# Patient Record
Sex: FEMALE | Race: WHITE | NOT HISPANIC OR LATINO | Employment: FULL TIME | URBAN - NONMETROPOLITAN AREA
[De-identification: names, ages, dates, MRNs, and addresses within clinical notes are randomized per-mention and may not be internally consistent; named-entity substitution may affect disease eponyms.]

---

## 2022-04-08 ENCOUNTER — TRANSCRIBE ORDERS (OUTPATIENT)
Dept: ADMINISTRATIVE | Facility: HOSPITAL | Age: 52
End: 2022-04-08

## 2022-04-08 DIAGNOSIS — C53.9 MALIGNANT NEOPLASM OF CERVIX, UNSPECIFIED SITE: Primary | ICD-10-CM

## 2022-04-15 ENCOUNTER — HOSPITAL ENCOUNTER (OUTPATIENT)
Dept: CT IMAGING | Facility: HOSPITAL | Age: 52
Discharge: HOME | End: 2022-04-15

## 2022-04-15 PROCEDURE — A9552 F18 FDG: HCPCS | Performed by: OBSTETRICS & GYNECOLOGY

## 2022-04-15 PROCEDURE — 0 FLUDEOXYGLUCOSE F18 SOLUTION: Performed by: OBSTETRICS & GYNECOLOGY

## 2022-04-15 PROCEDURE — 78815 PET IMAGE W/CT SKULL-THIGH: CPT

## 2022-04-15 RX ADMIN — FLUDEOXYGLUCOSE F18 1 DOSE: 300 INJECTION INTRAVENOUS at 11:07

## 2022-06-06 ENCOUNTER — TELEPHONE (OUTPATIENT)
Dept: RADIATION ONCOLOGY | Facility: HOSPITAL | Age: 52
End: 2022-06-06

## 2022-06-06 NOTE — TELEPHONE ENCOUNTER
Called to set up consult appt for pt, pt stated she is going to begin her external beam tx later this week at [facility]. She had called them regarding this appt and they stated it would be after her external txs were completed there. Scheduled pt for appt on July 6 @ 10 AM, pt to contact our office if it needs to be r/s.

## 2022-06-30 PROBLEM — C53.9 SQUAMOUS CELL CARCINOMA OF CERVIX: Status: ACTIVE | Noted: 2022-05-19

## 2022-07-04 NOTE — PROGRESS NOTES
[facility] MD      KERMIT Robles  ________________________________________  [facility]  Department of Radiation Oncology  [address] [place] [id number]  Office:  [phone]  Fax: [phone]    DATE:  07/06/2022  PATIENT:  Meghan Mary 1970                 MEDICAL RECORD #:  [id number]                                                       REASON FOR VISIT  Chief Complaint   Patient presents with   • Cervical Cancer     Discuss high dose rate brachytherapy     Meghan Mary is a very pleasant female that has been referred to our office for radiotherapy considerations. Reports appetite change, fatigue, dysuria, frequency with urination, and pelvic pain. Denies unexpected weight change, cough, SOB, nausea/vomiting, dizziness, extremity weakness, and headaches. She follows  and  in [place], [place].    History of Present Illness:  03/08/2022 - Transvaginal Ultrasound due to pelvic/perineal pain:  • Suboptimal visualization of the uterus with a thickened endometrium.  • Nonvisualization of the ovaries.    03/25/2022 - Diagnostic hysteroscopy with dilatation and curettage/biopsy:  • Endometrium (curettage):  o Fragments of invasive squamous cell carcinoma, moderately differentiated  • Cervix:  o Fragments of invasive squamous cell carcinoma, moderately differentiated  Note: The morphologic findings in part A and B are similar and show an invasive moderately differentiatesquamous cell carcinoma.  Depth of invasion cannot be assessed due to the fragmented and tangential nature of the biopsies.  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  A p16 stain has been ordered and will be reported in an addendum to follow.    04/15/2022 - PET Scan:  • Thickening and marked hypermetabolic activity in the cervix which extends into the lower uterus. Maximum SUV is 21.  • Hypermetabolic  soft tissue extends posteriorly from the cervix towards the rectosigmoid colon, and soft tissue hypermetabolic activity appears to directly involve the sigmoid colon. Maximum SUV is 18.39.   • Enlarged hypermetabolic LEFT pelvic sidewall lymph node measuring 1.9 x 1.8 cm with a maximum SUV of 11.69.  Impression:  • Hypermetabolic cervical wall thickening corresponding to biopsy-proven neoplasm. This process extends into the lower uterine body. Additionally, there is hypermetabolic soft tissue extending posteriorly towards the sigmoid colon and there appears to be direct invasion/involvement of the sigmoid colon.  • Enlarged hypermetabolic LEFT pelvic sidewall lymph node, most compatible with renan metastasis.  • No evidence of metastatic disease in the neck, chest, or abdomen.    04/29/2022 - Cystoscopy, proctoscopy, and cervical biopsies per  at the [facility]:  • Cervix biopsy:  o Squamous cell carcinoma, moderately differentiated  o Lymphovascular invasion can not be excluded.    05/04/2022 - Appointment with Demetria at the [facility]:  • Recommended treatment with primary whole pelvis external beam radiotherapy with concomitant cisplatin base chemotherapy to be followed by brachytherapy    05/19/2022 - Appointment with :  • I reviewed the patient's scans and discussed the case personally with our radiation oncologist. At this point in time we will proceed with concurrent chemoradiation therapy.   • She will need to be on weekly cisplatin. I thoroughly discussed with her the pros and cons risks and benefits of chemotherapy in details and she understood and agreed.   • Given the fact that she will be on cisplatin she will need to have port placement so we will send her for port consideration.   • When she have the port we can proceed with the treatment so we will see her back in 2 weeks to start her on the treatment.     She is currently undergoing radiation  therapy to the pelvis per  in [facility].    History obtained from  PATIENT and CHART    PAST MEDICAL HISTORY  Past Medical History:   Diagnosis Date   • Cervical cancer (HCC)    • Hypertension       PAST SURGICAL HISTORY  Past Surgical History:   Procedure Laterality Date   • BREAST LUMPECTOMY Right 2004    not malignant   • CERVICAL BIOPSY     • COLONOSCOPY        FAMILY HISTORY  family history includes Cancer in her mother.     SOCIAL HISTORY  Social History     Tobacco Use   • Smoking status: Former Smoker     Packs/day: 0.25     Years: 2.00     Pack years: 0.50   Substance Use Topics   • Alcohol use: Not Currently   • Drug use: Never        ALLERGIES  Patient has no known allergies.     MEDICATIONS    Current Outpatient Medications:   •  amLODIPine (NORVASC) 5 MG tablet, Take 5 mg by mouth Daily., Disp: , Rfl:   •  HYDROcodone-acetaminophen (NORCO) 7.5-325 MG per tablet, Take 1 tablet by mouth Every 6 (Six) Hours As Needed. Not taking, Disp: , Rfl:   •  lidocaine-prilocaine (EMLA) 2.5-2.5 % cream, For port, Disp: , Rfl:   •  ondansetron (ZOFRAN) 8 MG tablet, Take 1 tablet by mouth Every 8 (Eight) Hours As Needed., Disp: , Rfl:      Current outpatient and discharge medications have been reconciled for the patient.  Reviewed by: Jw Muñiz III, MD    The following portions of the patient's history were reviewed and updated as appropriate: allergies, current medications, past family history, past medical history, past social history, past surgical history and problem list.    REVIEW OF SYSTEMS  Review of Systems   Constitutional: Positive for appetite change and fatigue. Negative for unexpected weight change.   HENT:  Negative.    Eyes: Negative.    Respiratory: Negative for cough and shortness of breath.    Cardiovascular: Negative.    Gastrointestinal: Negative for nausea, rectal pain and vomiting.   Genitourinary: Positive for dysuria, frequency and pelvic pain.    Musculoskeletal: Negative.   "  Neurological: Negative for dizziness, extremity weakness and headaches.   Hematological: Negative.    Psychiatric/Behavioral: Negative.      I have reviewed and confirmed the accuracy of the ROS as documented by the MA/LPN/RN Jw Muñiz III, MD     PHYSICAL EXAM  Vital Signs:   Vitals:    07/06/22 1024   BP: 141/84   Pulse: 65   Resp: 18   SpO2: 99%   Weight: 88.9 kg (196 lb)   Height: 157.5 cm (62\")   PainSc: 0-No pain      Physical Exam  Vitals reviewed.   Constitutional:       Appearance: Normal appearance.   HENT:      Head: Normocephalic.   Cardiovascular:      Rate and Rhythm: Normal rate and regular rhythm.      Pulses: Normal pulses.      Heart sounds: Normal heart sounds. No murmur heard.    No friction rub. No gallop.   Pulmonary:      Effort: Pulmonary effort is normal. No respiratory distress.      Breath sounds: Normal breath sounds. No wheezing.   Chest:      Chest wall: No tenderness.   Abdominal:      General: Bowel sounds are normal.      Palpations: Abdomen is soft.   Genitourinary:     Comments: Deferred per patient, reports pain with pelvic exams.  Musculoskeletal:         General: Normal range of motion.      Cervical back: Normal range of motion and neck supple.   Skin:     General: Skin is warm and dry.      Capillary Refill: Capillary refill takes less than 2 seconds.   Neurological:      General: No focal deficit present.      Mental Status: She is alert.   Psychiatric:         Mood and Affect: Mood normal.         Behavior: Behavior normal.         Performance Status: ECOG (0) Fully active, able to carry on all predisease performance without restriction    Clinical Quality Measures  -Pain Documented by Standardized Tool, FPS  Meghan Mary reports a pain score of 0. Given her pain assessment as noted, treatment options were discussed and the following options were decided upon as a follow-up plan to address the patient's pain: No pain, no plan given.   Pain Medications             " HYDROcodone-acetaminophen (NORCO) 7.5-325 MG per tablet Take 1 tablet by mouth Every 6 (Six) Hours As Needed. Not taking        -Advanced Care Planning Advance Care Planning   ACP discussion was held with the patient during this visit. Patient has an advance directive in EMR which is still valid.      -Body Mass Index Screening and Follow-Up Plan Class 2 Severe Obesity (BMI >=35 and <=39.9). Obesity-related health conditions include the following: none.     -Tobacco Use: Screening and Cessation Intervention Social History    Tobacco Use      Smoking status: Former Smoker        Packs/day: 0.25        Years: 2.00        Pack years: .5      Smokeless tobacco: Not on file    ASSESSMENT AND PLAN  1. Squamous cell carcinoma of cervix (HCC)      Orders Placed This Encounter   Procedures   • Ambulatory Referral to ONC Social Work   • Ambulatory Referral to Radiation Oncology-External      RECOMMENDATIONS: Meghan Mary has been referred to our office today to discuss radiotherapy recommendations for vaginal brachytherapy.      We have discussed the indications and rationale of radiation therapy according to the NCCN Guidelines. I have extensively reviewed the risks, benefits and alternatives of therapy and progression of disease in spite of therapy with either local or systemic failure. The option of definitive surgery has also been reviewed as well as surveillance.  I have seen, examined and reviewed her medication list, appropriate labs and imaging studies as well as other physician notes. We discussed the goals/plans of care and answered all questions.     After consideration of the diagnostic data and evaluation of the patient, I have recommended to treat with brachytherapy. She does report significant pain with traditional pelvic examinations. Given the nature of these brachytherapy treatments, I will refer her to radiation oncology in [place] for treatment under anesthesia.     The patient and her family  verbalize understanding of this discussion, voice no further questions and wish to proceed with recommendations.  Continue ongoing management per primary care physician and other specialists. Thank you for allowing me to assist in this patients care.    Patient Instructions   1) Will refer you to radiation oncology in [place] for brachytherapy with anesthesia.     Time Spent: I spent 45 minutes caring for Meghan on this date of service. This time includes time spent by me in the following activities: preparing for the visit, reviewing tests, obtaining and/or reviewing a separately obtained history, performing a medically appropriate examination and/or evaluation, counseling and educating the patient/family/caregiver, ordering medications, tests, or procedures, referring and communicating with other health care professionals, documenting information in the medical record and independently interpreting results and communicating that information with the patient/family/caregiver.   Jw Muñiz III, MD   07/06/2022

## 2022-07-05 ENCOUNTER — HOSPITAL ENCOUNTER (OUTPATIENT)
Dept: RADIATION ONCOLOGY | Facility: HOSPITAL | Age: 52
Setting detail: RADIATION/ONCOLOGY SERIES
End: 2022-07-05

## 2022-07-06 ENCOUNTER — DOCUMENTATION (OUTPATIENT)
Dept: RADIATION ONCOLOGY | Facility: HOSPITAL | Age: 52
End: 2022-07-06

## 2022-07-06 ENCOUNTER — CONSULT (OUTPATIENT)
Dept: RADIATION ONCOLOGY | Facility: HOSPITAL | Age: 52
End: 2022-07-06

## 2022-07-06 VITALS
HEART RATE: 65 BPM | HEIGHT: 62 IN | BODY MASS INDEX: 36.07 KG/M2 | WEIGHT: 196 LBS | RESPIRATION RATE: 18 BRPM | OXYGEN SATURATION: 99 % | DIASTOLIC BLOOD PRESSURE: 84 MMHG | SYSTOLIC BLOOD PRESSURE: 141 MMHG

## 2022-07-06 DIAGNOSIS — C53.9 SQUAMOUS CELL CARCINOMA OF CERVIX: Primary | ICD-10-CM

## 2022-07-06 PROCEDURE — G0463 HOSPITAL OUTPT CLINIC VISIT: HCPCS | Performed by: RADIOLOGY

## 2022-07-06 RX ORDER — HYDROCODONE BITARTRATE AND ACETAMINOPHEN 7.5; 325 MG/1; MG/1
1 TABLET ORAL EVERY 6 HOURS PRN
COMMUNITY
Start: 2022-06-03

## 2022-07-06 RX ORDER — LIDOCAINE AND PRILOCAINE 25; 25 MG/G; MG/G
CREAM TOPICAL
COMMUNITY
Start: 2022-06-15

## 2022-07-06 RX ORDER — ONDANSETRON HYDROCHLORIDE 8 MG/1
1 TABLET, FILM COATED ORAL EVERY 8 HOURS PRN
COMMUNITY
Start: 2022-06-15

## 2022-07-06 RX ORDER — AMLODIPINE BESYLATE 5 MG/1
5 TABLET ORAL DAILY
COMMUNITY
Start: 2022-05-19

## 2022-07-06 NOTE — PROGRESS NOTES
NAYAN met with Ms. Mary due to her distress score. She is here for a radiation consultation for squamous cell carcinomas of cervix. NAYAN introduced self and explained role and source of support. She is 52 years old and lives with her children ages are 14, 16, and 17. Her support system does include her patents, children, and sister. Ms. Mary states she does not like to express her feelings. She currently works at [employer] and has been working throughout treatment. She is feeling very nervous about the thought of internal radiation. NAYAN provided emotional support through utilizing empathy and validating and normalizing identified emotions. She does not take any medication for anxiety or depression and she does not see a counselor. She does have financial concerns regarding the distance she would have to drive to treatment. NAYAN did inform her about gas assistance. Ms. Mary states since she had covid in October nothing seems to taste good but she tries to eat two meals a day. She has been easily fatigued. NAYAN did inform her about the resource room in the event she needed a wig or chemo beanie. NAYAN will remain available.